# Patient Record
Sex: MALE | Race: OTHER | NOT HISPANIC OR LATINO | URBAN - METROPOLITAN AREA
[De-identification: names, ages, dates, MRNs, and addresses within clinical notes are randomized per-mention and may not be internally consistent; named-entity substitution may affect disease eponyms.]

---

## 2025-02-21 ENCOUNTER — INPATIENT (INPATIENT)
Facility: HOSPITAL | Age: 25
LOS: 0 days | Discharge: HOME | DRG: 176 | End: 2025-02-22
Attending: HOSPITALIST | Admitting: INTERNAL MEDICINE
Payer: COMMERCIAL

## 2025-02-21 VITALS
WEIGHT: 190.04 LBS | HEIGHT: 65 IN | SYSTOLIC BLOOD PRESSURE: 157 MMHG | TEMPERATURE: 98 F | OXYGEN SATURATION: 99 % | DIASTOLIC BLOOD PRESSURE: 86 MMHG | HEART RATE: 74 BPM | RESPIRATION RATE: 18 BRPM

## 2025-02-21 DIAGNOSIS — I26.99 OTHER PULMONARY EMBOLISM WITHOUT ACUTE COR PULMONALE: ICD-10-CM

## 2025-02-21 LAB
ALBUMIN SERPL ELPH-MCNC: 4.6 G/DL — SIGNIFICANT CHANGE UP (ref 3.5–5.2)
ALP SERPL-CCNC: 108 U/L — SIGNIFICANT CHANGE UP (ref 30–115)
ALT FLD-CCNC: 17 U/L — SIGNIFICANT CHANGE UP (ref 0–41)
ANION GAP SERPL CALC-SCNC: 12 MMOL/L — SIGNIFICANT CHANGE UP (ref 7–14)
APTT BLD: 32.8 SEC — SIGNIFICANT CHANGE UP (ref 27–39.2)
AST SERPL-CCNC: 27 U/L — SIGNIFICANT CHANGE UP (ref 0–41)
BASOPHILS # BLD AUTO: 0.05 K/UL — SIGNIFICANT CHANGE UP (ref 0–0.2)
BASOPHILS NFR BLD AUTO: 0.5 % — SIGNIFICANT CHANGE UP (ref 0–1)
BILIRUB SERPL-MCNC: 0.3 MG/DL — SIGNIFICANT CHANGE UP (ref 0.2–1.2)
BUN SERPL-MCNC: 20 MG/DL — SIGNIFICANT CHANGE UP (ref 10–20)
CALCIUM SERPL-MCNC: 9.1 MG/DL — SIGNIFICANT CHANGE UP (ref 8.4–10.5)
CHLORIDE SERPL-SCNC: 102 MMOL/L — SIGNIFICANT CHANGE UP (ref 98–110)
CO2 SERPL-SCNC: 22 MMOL/L — SIGNIFICANT CHANGE UP (ref 17–32)
CREAT SERPL-MCNC: 1.1 MG/DL — SIGNIFICANT CHANGE UP (ref 0.7–1.5)
EGFR: 95 ML/MIN/1.73M2 — SIGNIFICANT CHANGE UP
EGFR: 95 ML/MIN/1.73M2 — SIGNIFICANT CHANGE UP
EOSINOPHIL # BLD AUTO: 0.58 K/UL — SIGNIFICANT CHANGE UP (ref 0–0.7)
EOSINOPHIL NFR BLD AUTO: 5.9 % — SIGNIFICANT CHANGE UP (ref 0–8)
GLUCOSE SERPL-MCNC: 95 MG/DL — SIGNIFICANT CHANGE UP (ref 70–99)
HCT VFR BLD CALC: 42.7 % — SIGNIFICANT CHANGE UP (ref 42–52)
HGB BLD-MCNC: 14.7 G/DL — SIGNIFICANT CHANGE UP (ref 14–18)
IMM GRANULOCYTES NFR BLD AUTO: 0.2 % — SIGNIFICANT CHANGE UP (ref 0.1–0.3)
INR BLD: 1.02 RATIO — SIGNIFICANT CHANGE UP (ref 0.65–1.3)
LYMPHOCYTES # BLD AUTO: 2.78 K/UL — SIGNIFICANT CHANGE UP (ref 1.2–3.4)
LYMPHOCYTES # BLD AUTO: 28.3 % — SIGNIFICANT CHANGE UP (ref 20.5–51.1)
MCHC RBC-ENTMCNC: 30.1 PG — SIGNIFICANT CHANGE UP (ref 27–31)
MCHC RBC-ENTMCNC: 34.4 G/DL — SIGNIFICANT CHANGE UP (ref 32–37)
MCV RBC AUTO: 87.5 FL — SIGNIFICANT CHANGE UP (ref 80–94)
MONOCYTES # BLD AUTO: 0.55 K/UL — SIGNIFICANT CHANGE UP (ref 0.1–0.6)
MONOCYTES NFR BLD AUTO: 5.6 % — SIGNIFICANT CHANGE UP (ref 1.7–9.3)
NEUTROPHILS # BLD AUTO: 5.85 K/UL — SIGNIFICANT CHANGE UP (ref 1.4–6.5)
NEUTROPHILS NFR BLD AUTO: 59.5 % — SIGNIFICANT CHANGE UP (ref 42.2–75.2)
NRBC BLD AUTO-RTO: 0 /100 WBCS — SIGNIFICANT CHANGE UP (ref 0–0)
NT-PROBNP SERPL-SCNC: <36 PG/ML — SIGNIFICANT CHANGE UP (ref 0–300)
PLATELET # BLD AUTO: 196 K/UL — SIGNIFICANT CHANGE UP (ref 130–400)
PMV BLD: 10.5 FL — HIGH (ref 7.4–10.4)
POTASSIUM SERPL-MCNC: 4.5 MMOL/L — SIGNIFICANT CHANGE UP (ref 3.5–5)
POTASSIUM SERPL-SCNC: 4.5 MMOL/L — SIGNIFICANT CHANGE UP (ref 3.5–5)
PROT SERPL-MCNC: 7.4 G/DL — SIGNIFICANT CHANGE UP (ref 6–8)
PROTHROM AB SERPL-ACNC: 12.1 SEC — SIGNIFICANT CHANGE UP (ref 9.95–12.87)
RBC # BLD: 4.88 M/UL — SIGNIFICANT CHANGE UP (ref 4.7–6.1)
RBC # FLD: 13.2 % — SIGNIFICANT CHANGE UP (ref 11.5–14.5)
SODIUM SERPL-SCNC: 136 MMOL/L — SIGNIFICANT CHANGE UP (ref 135–146)
TROPONIN T, HIGH SENSITIVITY RESULT: <6 NG/L — SIGNIFICANT CHANGE UP (ref 6–21)
WBC # BLD: 9.83 K/UL — SIGNIFICANT CHANGE UP (ref 4.8–10.8)
WBC # FLD AUTO: 9.83 K/UL — SIGNIFICANT CHANGE UP (ref 4.8–10.8)

## 2025-02-21 PROCEDURE — 99285 EMERGENCY DEPT VISIT HI MDM: CPT

## 2025-02-21 PROCEDURE — 85025 COMPLETE CBC W/AUTO DIFF WBC: CPT

## 2025-02-21 PROCEDURE — 86147 CARDIOLIPIN ANTIBODY EA IG: CPT

## 2025-02-21 PROCEDURE — 85730 THROMBOPLASTIN TIME PARTIAL: CPT

## 2025-02-21 PROCEDURE — 71275 CT ANGIOGRAPHY CHEST: CPT | Mod: 26

## 2025-02-21 PROCEDURE — 85306 CLOT INHIBIT PROT S FREE: CPT

## 2025-02-21 PROCEDURE — 36415 COLL VENOUS BLD VENIPUNCTURE: CPT

## 2025-02-21 PROCEDURE — 86146 BETA-2 GLYCOPROTEIN ANTIBODY: CPT

## 2025-02-21 PROCEDURE — 73060 X-RAY EXAM OF HUMERUS: CPT | Mod: 26,RT

## 2025-02-21 PROCEDURE — 85613 RUSSELL VIPER VENOM DILUTED: CPT

## 2025-02-21 PROCEDURE — 93970 EXTREMITY STUDY: CPT

## 2025-02-21 PROCEDURE — 85300 ANTITHROMBIN III ACTIVITY: CPT

## 2025-02-21 PROCEDURE — 83735 ASSAY OF MAGNESIUM: CPT

## 2025-02-21 PROCEDURE — 80048 BASIC METABOLIC PNL TOTAL CA: CPT

## 2025-02-21 PROCEDURE — 93971 EXTREMITY STUDY: CPT | Mod: 26,RT

## 2025-02-21 PROCEDURE — 85303 CLOT INHIBIT PROT C ACTIVITY: CPT

## 2025-02-21 RX ORDER — ENOXAPARIN SODIUM 100 MG/ML
80 INJECTION SUBCUTANEOUS ONCE
Refills: 0 | Status: COMPLETED | OUTPATIENT
Start: 2025-02-21 | End: 2025-02-21

## 2025-02-21 RX ORDER — ACETAMINOPHEN 500 MG/5ML
650 LIQUID (ML) ORAL ONCE
Refills: 0 | Status: COMPLETED | OUTPATIENT
Start: 2025-02-21 | End: 2025-02-21

## 2025-02-21 RX ADMIN — ENOXAPARIN SODIUM 80 MILLIGRAM(S): 100 INJECTION SUBCUTANEOUS at 23:07

## 2025-02-21 RX ADMIN — Medication 650 MILLIGRAM(S): at 22:17

## 2025-02-22 VITALS
OXYGEN SATURATION: 99 % | DIASTOLIC BLOOD PRESSURE: 72 MMHG | RESPIRATION RATE: 18 BRPM | HEART RATE: 79 BPM | SYSTOLIC BLOOD PRESSURE: 118 MMHG

## 2025-02-22 LAB
ANION GAP SERPL CALC-SCNC: 10 MMOL/L — SIGNIFICANT CHANGE UP (ref 7–14)
APTT BLD: 38.2 SEC — SIGNIFICANT CHANGE UP (ref 27–39.2)
BASOPHILS # BLD AUTO: 0.05 K/UL — SIGNIFICANT CHANGE UP (ref 0–0.2)
BASOPHILS NFR BLD AUTO: 0.5 % — SIGNIFICANT CHANGE UP (ref 0–1)
BUN SERPL-MCNC: 18 MG/DL — SIGNIFICANT CHANGE UP (ref 10–20)
CALCIUM SERPL-MCNC: 9 MG/DL — SIGNIFICANT CHANGE UP (ref 8.4–10.5)
CHLORIDE SERPL-SCNC: 104 MMOL/L — SIGNIFICANT CHANGE UP (ref 98–110)
CO2 SERPL-SCNC: 26 MMOL/L — SIGNIFICANT CHANGE UP (ref 17–32)
CREAT SERPL-MCNC: 1 MG/DL — SIGNIFICANT CHANGE UP (ref 0.7–1.5)
EGFR: 106 ML/MIN/1.73M2 — SIGNIFICANT CHANGE UP
EGFR: 106 ML/MIN/1.73M2 — SIGNIFICANT CHANGE UP
EOSINOPHIL # BLD AUTO: 0.61 K/UL — SIGNIFICANT CHANGE UP (ref 0–0.7)
EOSINOPHIL NFR BLD AUTO: 6.7 % — SIGNIFICANT CHANGE UP (ref 0–8)
GLUCOSE SERPL-MCNC: 96 MG/DL — SIGNIFICANT CHANGE UP (ref 70–99)
HCT VFR BLD CALC: 42.3 % — SIGNIFICANT CHANGE UP (ref 42–52)
HGB BLD-MCNC: 14.2 G/DL — SIGNIFICANT CHANGE UP (ref 14–18)
IMM GRANULOCYTES NFR BLD AUTO: 0.3 % — SIGNIFICANT CHANGE UP (ref 0.1–0.3)
LYMPHOCYTES # BLD AUTO: 3.24 K/UL — SIGNIFICANT CHANGE UP (ref 1.2–3.4)
LYMPHOCYTES # BLD AUTO: 35.5 % — SIGNIFICANT CHANGE UP (ref 20.5–51.1)
MAGNESIUM SERPL-MCNC: 2 MG/DL — SIGNIFICANT CHANGE UP (ref 1.8–2.4)
MCHC RBC-ENTMCNC: 29.4 PG — SIGNIFICANT CHANGE UP (ref 27–31)
MCHC RBC-ENTMCNC: 33.6 G/DL — SIGNIFICANT CHANGE UP (ref 32–37)
MCV RBC AUTO: 87.6 FL — SIGNIFICANT CHANGE UP (ref 80–94)
MONOCYTES # BLD AUTO: 0.45 K/UL — SIGNIFICANT CHANGE UP (ref 0.1–0.6)
MONOCYTES NFR BLD AUTO: 4.9 % — SIGNIFICANT CHANGE UP (ref 1.7–9.3)
NEUTROPHILS # BLD AUTO: 4.74 K/UL — SIGNIFICANT CHANGE UP (ref 1.4–6.5)
NEUTROPHILS NFR BLD AUTO: 52.1 % — SIGNIFICANT CHANGE UP (ref 42.2–75.2)
NRBC BLD AUTO-RTO: 0 /100 WBCS — SIGNIFICANT CHANGE UP (ref 0–0)
PLATELET # BLD AUTO: 176 K/UL — SIGNIFICANT CHANGE UP (ref 130–400)
PMV BLD: 10.5 FL — HIGH (ref 7.4–10.4)
POTASSIUM SERPL-MCNC: 4.3 MMOL/L — SIGNIFICANT CHANGE UP (ref 3.5–5)
POTASSIUM SERPL-SCNC: 4.3 MMOL/L — SIGNIFICANT CHANGE UP (ref 3.5–5)
RBC # BLD: 4.83 M/UL — SIGNIFICANT CHANGE UP (ref 4.7–6.1)
RBC # FLD: 13.2 % — SIGNIFICANT CHANGE UP (ref 11.5–14.5)
SODIUM SERPL-SCNC: 140 MMOL/L — SIGNIFICANT CHANGE UP (ref 135–146)
WBC # BLD: 9.12 K/UL — SIGNIFICANT CHANGE UP (ref 4.8–10.8)
WBC # FLD AUTO: 9.12 K/UL — SIGNIFICANT CHANGE UP (ref 4.8–10.8)

## 2025-02-22 PROCEDURE — 99235 HOSP IP/OBS SAME DATE MOD 70: CPT

## 2025-02-22 PROCEDURE — 93970 EXTREMITY STUDY: CPT | Mod: 26

## 2025-02-22 RX ORDER — APIXABAN 2.5 MG/1
2 TABLET, FILM COATED ORAL
Qty: 74 | Refills: 0
Start: 2025-02-22 | End: 2025-03-23

## 2025-02-22 RX ORDER — APIXABAN 2.5 MG/1
10 TABLET, FILM COATED ORAL EVERY 12 HOURS
Refills: 0 | Status: DISCONTINUED | OUTPATIENT
Start: 2025-02-22 | End: 2025-02-22

## 2025-02-22 RX ORDER — APIXABAN 2.5 MG/1
1 TABLET, FILM COATED ORAL
Qty: 73 | Refills: 0
Start: 2025-02-22 | End: 2025-03-23

## 2025-02-22 RX ORDER — ACETAMINOPHEN 500 MG/5ML
650 LIQUID (ML) ORAL EVERY 6 HOURS
Refills: 0 | Status: DISCONTINUED | OUTPATIENT
Start: 2025-02-22 | End: 2025-02-22

## 2025-02-22 RX ADMIN — Medication 1 APPLICATION(S): at 05:47

## 2025-02-22 RX ADMIN — APIXABAN 10 MILLIGRAM(S): 2.5 TABLET, FILM COATED ORAL at 08:18

## 2025-02-24 LAB
AT III ACT/NOR PPP CHRO: 92 % — SIGNIFICANT CHANGE UP (ref 85–135)
DRVVT RATIO: 1.02 RATIO — SIGNIFICANT CHANGE UP (ref 0–1.21)
DRVVT SCREEN TO CONFIRM RATIO: SIGNIFICANT CHANGE UP
NORMALIZED SCT PPP-RTO: 1 RATIO — SIGNIFICANT CHANGE UP (ref 0–1.16)
NORMALIZED SCT PPP-RTO: SIGNIFICANT CHANGE UP
PROT C ACT/NOR PPP: 91 % — SIGNIFICANT CHANGE UP (ref 65–129)
PROT S FREE AG PPP IA-ACNC: 107 % — SIGNIFICANT CHANGE UP (ref 67–141)

## 2025-02-26 DIAGNOSIS — I26.93 SINGLE SUBSEGMENTAL THROMBOTIC PULMONARY EMBOLISM WITHOUT ACUTE COR PULMONALE: ICD-10-CM

## 2025-02-26 DIAGNOSIS — I26.99 OTHER PULMONARY EMBOLISM WITHOUT ACUTE COR PULMONALE: ICD-10-CM

## 2025-02-26 LAB
B2 GLYCOPROT1 IGA SER QL: <2 U/ML — SIGNIFICANT CHANGE UP
B2 GLYCOPROT1 IGG SER-ACNC: <1.4 U/ML — SIGNIFICANT CHANGE UP
B2 GLYCOPROT1 IGM SER-ACNC: <1.5 U/ML — SIGNIFICANT CHANGE UP
CARDIOLIPIN IGM SER-MCNC: <1.5 MPL U/ML — SIGNIFICANT CHANGE UP
CARDIOLIPIN IGM SER-MCNC: <1.6 GPL U/ML — SIGNIFICANT CHANGE UP
DEPRECATED CARDIOLIPIN IGA SER: <2 APL U/ML — SIGNIFICANT CHANGE UP

## 2025-03-20 ENCOUNTER — APPOINTMENT (OUTPATIENT)
Age: 25
End: 2025-03-20

## 2025-03-20 ENCOUNTER — OUTPATIENT (OUTPATIENT)
Dept: OUTPATIENT SERVICES | Facility: HOSPITAL | Age: 25
LOS: 1 days | End: 2025-03-20
Payer: COMMERCIAL

## 2025-03-20 ENCOUNTER — APPOINTMENT (OUTPATIENT)
Age: 25
End: 2025-03-20
Payer: COMMERCIAL

## 2025-03-20 ENCOUNTER — TRANSCRIPTION ENCOUNTER (OUTPATIENT)
Age: 25
End: 2025-03-20

## 2025-03-20 VITALS
DIASTOLIC BLOOD PRESSURE: 83 MMHG | OXYGEN SATURATION: 100 % | RESPIRATION RATE: 16 BRPM | HEART RATE: 69 BPM | HEIGHT: 65 IN | TEMPERATURE: 98.1 F | WEIGHT: 187 LBS | BODY MASS INDEX: 31.16 KG/M2 | SYSTOLIC BLOOD PRESSURE: 127 MMHG

## 2025-03-20 DIAGNOSIS — Z78.9 OTHER SPECIFIED HEALTH STATUS: ICD-10-CM

## 2025-03-20 DIAGNOSIS — D68.9 COAGULATION DEFECT, UNSPECIFIED: ICD-10-CM

## 2025-03-20 PROBLEM — Z00.00 ENCOUNTER FOR PREVENTIVE HEALTH EXAMINATION: Status: ACTIVE | Noted: 2025-03-20

## 2025-03-20 PROCEDURE — 81241 F5 GENE: CPT

## 2025-03-20 PROCEDURE — 99204 OFFICE O/P NEW MOD 45 MIN: CPT

## 2025-03-20 PROCEDURE — 81240 F2 GENE: CPT

## 2025-03-20 RX ORDER — APIXABAN 5 MG/1
TABLET, FILM COATED ORAL
Refills: 0 | Status: ACTIVE | COMMUNITY

## 2025-03-20 RX ORDER — APIXABAN 5 MG/1
5 TABLET, FILM COATED ORAL
Qty: 180 | Refills: 2 | Status: ACTIVE | COMMUNITY
Start: 2025-03-20 | End: 1900-01-01

## 2025-03-21 DIAGNOSIS — D68.9 COAGULATION DEFECT, UNSPECIFIED: ICD-10-CM

## 2025-03-26 ENCOUNTER — NON-APPOINTMENT (OUTPATIENT)
Age: 25
End: 2025-03-26

## 2025-03-27 ENCOUNTER — APPOINTMENT (OUTPATIENT)
Dept: VASCULAR SURGERY | Facility: CLINIC | Age: 25
End: 2025-03-27
Payer: COMMERCIAL

## 2025-03-27 VITALS — SYSTOLIC BLOOD PRESSURE: 121 MMHG | DIASTOLIC BLOOD PRESSURE: 73 MMHG

## 2025-03-27 VITALS — HEIGHT: 65 IN | WEIGHT: 187 LBS | BODY MASS INDEX: 31.16 KG/M2

## 2025-03-27 DIAGNOSIS — M79.89 OTHER SPECIFIED SOFT TISSUE DISORDERS: ICD-10-CM

## 2025-03-27 DIAGNOSIS — I82.629 ACUTE EMBOLISM AND THROMBOSIS OF DEEP VEINS OF UNSPECIFIED UPPER EXTREMITY: ICD-10-CM

## 2025-03-27 PROBLEM — I26.99 PULMONARY EMBOLISM, UNSPECIFIED CHRONICITY, UNSPECIFIED PULMONARY EMBOLISM TYPE, UNSPECIFIED WHETHER ACUTE COR PULMONALE PRESENT: Status: ACTIVE | Noted: 2025-03-27

## 2025-03-27 LAB
DNA PLOIDY SPEC FC-IMP: NORMAL
PTR INTERP: NORMAL

## 2025-03-27 PROCEDURE — 99204 OFFICE O/P NEW MOD 45 MIN: CPT

## 2025-03-27 PROCEDURE — 93971 EXTREMITY STUDY: CPT

## 2025-03-28 ENCOUNTER — OUTPATIENT (OUTPATIENT)
Dept: OUTPATIENT SERVICES | Facility: HOSPITAL | Age: 25
LOS: 1 days | End: 2025-03-28
Payer: COMMERCIAL

## 2025-03-28 VITALS
SYSTOLIC BLOOD PRESSURE: 110 MMHG | DIASTOLIC BLOOD PRESSURE: 70 MMHG | TEMPERATURE: 98 F | RESPIRATION RATE: 16 BRPM | WEIGHT: 186.95 LBS | HEART RATE: 76 BPM | OXYGEN SATURATION: 99 % | HEIGHT: 66 IN

## 2025-03-28 DIAGNOSIS — Z01.818 ENCOUNTER FOR OTHER PREPROCEDURAL EXAMINATION: ICD-10-CM

## 2025-03-28 DIAGNOSIS — Z92.89 PERSONAL HISTORY OF OTHER MEDICAL TREATMENT: Chronic | ICD-10-CM

## 2025-03-28 PROCEDURE — 99214 OFFICE O/P EST MOD 30 MIN: CPT | Mod: 25

## 2025-03-28 NOTE — H&P PST ADULT - HISTORY OF PRESENT ILLNESS
23 Y/O MALE PT TO PAST WITH HX              PT NOW FOR SCHEDULED PROCEDURE. PT DENIES ANY CP SOB PALP COUGH DYSURIA FEVER URI. PT ABLE TO GRACIELA 1-2 FOS W/O SOB  Anesthesia Alert  NO--Difficult Airway  NO--History of neck surgery or radiation  NO--Limited ROM of neck  NO--History of Malignant hyperthermia  NO--Personal or family history of Pseudocholinesterase deficiency.  NO--Prior Anesthesia Complication  NO--Latex Allergy  NO--Loose teeth  NO--History of Rheumatoid Arthritis  NO--AICHA  NO--Bleeding risk  NO--Other_____   25 Y/O MALE PT TO PAST WITH HX  DVT/ PE 2/21/25  PT C/O SWELLING RUE AND SOB X 1 DAY  PT WORKED UP BY HEME ALL TESTS NEG SO FAR  PT NOW FOR SCHEDULED PROCEDURE ( RUE VENOGRAM, POSS ENDOVASCULAR REVASCULARIZATION . PT DENIES ANY CP SOB PALP COUGH DYSURIA FEVER URI.   Anesthesia Alert  NO--Difficult Airway  NO--History of neck surgery or radiation  NO--Limited ROM of neck  NO--History of Malignant hyperthermia  NO--Personal or family history of Pseudocholinesterase deficiency.  NO--Prior Anesthesia Complication  NO--Latex Allergy  NO--Loose teeth  NO--History of Rheumatoid Arthritis  NO--AICHA  NO--Bleeding risk  NO--Other_____  RCRI CLASS I  DASI 9 METS

## 2025-03-29 DIAGNOSIS — I82.629 ACUTE EMBOLISM AND THROMBOSIS OF DEEP VEINS OF UNSPECIFIED UPPER EXTREMITY: ICD-10-CM

## 2025-03-29 DIAGNOSIS — Z01.818 ENCOUNTER FOR OTHER PREPROCEDURAL EXAMINATION: ICD-10-CM

## 2025-03-31 ENCOUNTER — APPOINTMENT (OUTPATIENT)
Facility: CLINIC | Age: 25
End: 2025-03-31
Payer: COMMERCIAL

## 2025-03-31 VITALS
DIASTOLIC BLOOD PRESSURE: 70 MMHG | HEART RATE: 187 BPM | BODY MASS INDEX: 31.12 KG/M2 | RESPIRATION RATE: 14 BRPM | WEIGHT: 187 LBS | OXYGEN SATURATION: 99 % | SYSTOLIC BLOOD PRESSURE: 110 MMHG

## 2025-03-31 DIAGNOSIS — I26.99 OTHER PULMONARY EMBOLISM W/OUT ACUTE COR PULMONALE: ICD-10-CM

## 2025-03-31 PROBLEM — I82.409 ACUTE EMBOLISM AND THROMBOSIS OF UNSPECIFIED DEEP VEINS OF UNSPECIFIED LOWER EXTREMITY: Chronic | Status: ACTIVE | Noted: 2025-03-28

## 2025-03-31 PROCEDURE — 99204 OFFICE O/P NEW MOD 45 MIN: CPT

## 2025-04-02 ENCOUNTER — OUTPATIENT (OUTPATIENT)
Dept: OUTPATIENT SERVICES | Facility: HOSPITAL | Age: 25
LOS: 1 days | End: 2025-04-02
Payer: COMMERCIAL

## 2025-04-02 ENCOUNTER — APPOINTMENT (OUTPATIENT)
Dept: PULMONOLOGY | Facility: HOSPITAL | Age: 25
End: 2025-04-02

## 2025-04-02 DIAGNOSIS — R06.02 SHORTNESS OF BREATH: ICD-10-CM

## 2025-04-02 DIAGNOSIS — Z92.89 PERSONAL HISTORY OF OTHER MEDICAL TREATMENT: Chronic | ICD-10-CM

## 2025-04-02 PROCEDURE — 94664 DEMO&/EVAL PT USE INHALER: CPT

## 2025-04-02 PROCEDURE — 94729 DIFFUSING CAPACITY: CPT

## 2025-04-02 PROCEDURE — 94727 GAS DIL/WSHOT DETER LNG VOL: CPT

## 2025-04-02 PROCEDURE — 94070 EVALUATION OF WHEEZING: CPT

## 2025-04-03 DIAGNOSIS — R06.02 SHORTNESS OF BREATH: ICD-10-CM

## 2025-04-08 NOTE — ASU PATIENT PROFILE, ADULT - FALL HARM RISK - UNIVERSAL INTERVENTIONS
Bed in lowest position, wheels locked, appropriate side rails in place/Call bell, personal items and telephone in reach/Instruct patient to call for assistance before getting out of bed or chair/Non-slip footwear when patient is out of bed/[place] to call system/Physically safe environment - no spills, clutter or unnecessary equipment/Purposeful Proactive Rounding/Room/bathroom lighting operational, light cord in reach

## 2025-04-09 ENCOUNTER — OUTPATIENT (OUTPATIENT)
Dept: OUTPATIENT SERVICES | Facility: HOSPITAL | Age: 25
LOS: 1 days | Discharge: HOME | End: 2025-04-09
Payer: COMMERCIAL

## 2025-04-09 ENCOUNTER — APPOINTMENT (OUTPATIENT)
Dept: VASCULAR SURGERY | Facility: HOSPITAL | Age: 25
End: 2025-04-09

## 2025-04-09 ENCOUNTER — TRANSCRIPTION ENCOUNTER (OUTPATIENT)
Age: 25
End: 2025-04-09

## 2025-04-09 VITALS
TEMPERATURE: 98 F | WEIGHT: 186.95 LBS | DIASTOLIC BLOOD PRESSURE: 71 MMHG | SYSTOLIC BLOOD PRESSURE: 130 MMHG | OXYGEN SATURATION: 100 % | HEIGHT: 65 IN | HEART RATE: 87 BPM

## 2025-04-09 VITALS
RESPIRATION RATE: 12 BRPM | OXYGEN SATURATION: 99 % | SYSTOLIC BLOOD PRESSURE: 110 MMHG | HEART RATE: 70 BPM | TEMPERATURE: 98 F | DIASTOLIC BLOOD PRESSURE: 70 MMHG

## 2025-04-09 DIAGNOSIS — Z92.89 PERSONAL HISTORY OF OTHER MEDICAL TREATMENT: Chronic | ICD-10-CM

## 2025-04-09 PROCEDURE — 76937 US GUIDE VASCULAR ACCESS: CPT | Mod: 26

## 2025-04-09 PROCEDURE — C1757: CPT

## 2025-04-09 PROCEDURE — 37187 VENOUS MECH THROMBECTOMY: CPT | Mod: RT

## 2025-04-09 PROCEDURE — C1894: CPT

## 2025-04-09 PROCEDURE — C1725: CPT

## 2025-04-09 PROCEDURE — 36010 PLACE CATHETER IN VEIN: CPT | Mod: RT

## 2025-04-09 PROCEDURE — C1769: CPT

## 2025-04-09 PROCEDURE — 73090 X-RAY EXAM OF FOREARM: CPT | Mod: RT

## 2025-04-09 PROCEDURE — 37248 TRLUML BALO ANGIOP 1ST VEIN: CPT | Mod: RT

## 2025-04-09 PROCEDURE — C1887: CPT

## 2025-04-09 RX ORDER — SODIUM CHLORIDE 9 G/1000ML
1000 INJECTION, SOLUTION INTRAVENOUS
Refills: 0 | Status: DISCONTINUED | OUTPATIENT
Start: 2025-04-09 | End: 2025-04-09

## 2025-04-09 RX ORDER — APIXABAN 2.5 MG/1
1 TABLET, FILM COATED ORAL
Refills: 0 | DISCHARGE

## 2025-04-09 RX ORDER — OXYCODONE HYDROCHLORIDE AND ACETAMINOPHEN 10; 325 MG/1; MG/1
1 TABLET ORAL ONCE
Refills: 0 | Status: DISCONTINUED | OUTPATIENT
Start: 2025-04-09 | End: 2025-04-09

## 2025-04-09 RX ORDER — HYDROMORPHONE/SOD CHLOR,ISO/PF 2 MG/10 ML
0.5 SYRINGE (ML) INJECTION
Refills: 0 | Status: DISCONTINUED | OUTPATIENT
Start: 2025-04-09 | End: 2025-04-09

## 2025-04-09 RX ORDER — HYDROMORPHONE/SOD CHLOR,ISO/PF 2 MG/10 ML
1 SYRINGE (ML) INJECTION
Refills: 0 | Status: DISCONTINUED | OUTPATIENT
Start: 2025-04-09 | End: 2025-04-09

## 2025-04-09 RX ORDER — ONDANSETRON HCL/PF 4 MG/2 ML
4 VIAL (ML) INJECTION ONCE
Refills: 0 | Status: DISCONTINUED | OUTPATIENT
Start: 2025-04-09 | End: 2025-04-09

## 2025-04-09 RX ORDER — OXYCODONE HYDROCHLORIDE AND ACETAMINOPHEN 10; 325 MG/1; MG/1
2 TABLET ORAL ONCE
Refills: 0 | Status: DISCONTINUED | OUTPATIENT
Start: 2025-04-09 | End: 2025-04-09

## 2025-04-09 NOTE — ASU DISCHARGE PLAN (ADULT/PEDIATRIC) - CARE PROVIDER_API CALL
Manuel Siu.  Vascular Surgery  [address] [place] [address], [address]  [place] [address]  Phone: [phone]  Fax: [phone]  Established Patient  Follow Up Time: 2 weeks

## 2025-04-09 NOTE — BRIEF OPERATIVE NOTE - OPERATION/FINDINGS
Procedure:  Ultrasound guided access of right basilic vein  Thoracic and right upper extremity venogram  In-Thrill mechanical thrombectomy of right subclavian vein DVT  POBA right subclavian vein    Findings:  Chronic right subclavian DVT with extrinsic compression of the subclavian vein at the level of the thoracic outlet

## 2025-04-09 NOTE — BRIEF OPERATIVE NOTE - NSICDXBRIEFPREOP_GEN_ALL_CORE_FT
PRE-OP DIAGNOSIS:  Venous thoracic outlet syndrome of subclavian vein 09-Apr-2025 08:57:07  Libby Green

## 2025-04-09 NOTE — ASU DISCHARGE PLAN (ADULT/PEDIATRIC) - ASU DC SPECIAL INSTRUCTIONSFT
Please remove the dressings to your right arm after 48 hours. OK to shower 48 hours after your procedure. Please no soaking in tubs or swimming until your incision has fully healed. Please replace the wrap or apply a compression sleeve to your right arm after you remove the bandage.

## 2025-04-09 NOTE — CHART NOTE - NSCHARTNOTEFT_GEN_A_CORE
PACU ANESTHESIA ADMISSION NOTE      Procedure: Venous thrombectomy with imaging guidance  Post op diagnosis:        __x__  Patent Airway    ____  Full return of protective reflexes    ____  Full recovery from anesthesia / back to baseline     Vitals:   T: 98.1 F          R:  12                BP:  134/78                Sat:  100%                 P: 79      Mental Status:  ___x_ Awake   _____ Alert   _____ Drowsy   _____ Sedated    Nausea/Vomiting:  ____ NO  ______Yes,   See Post - Op Orders          Pain Scale (0-10):  _____    Treatment: ____ None    ___x_ See Post - Op/PCA Orders    Post - Operative Fluids:   ____ Oral   __x__ See Post - Op Orders    Plan: Discharge:   __x__Home       _____Floor     _____Critical Care    _____  Other:_________________    Comments: Pt tolerated procedure well, no anesthesia related complications. Care of pt endorsed to PACU, report given to PACU RN. Discharge when criteria are met.

## 2025-04-09 NOTE — ASU DISCHARGE PLAN (ADULT/PEDIATRIC) - FINANCIAL ASSISTANCE
[facility] provides services at a reduced cost to those who are determined to be eligible through [facility]’s financial assistance program. Information regarding [facility]’s financial assistance program can be found by going to [email or web address] or by calling [phone].

## 2025-04-16 DIAGNOSIS — I82.621 ACUTE EMBOLISM AND THROMBOSIS OF DEEP VEINS OF RIGHT UPPER EXTREMITY: ICD-10-CM

## 2025-04-16 DIAGNOSIS — Z79.01 LONG TERM (CURRENT) USE OF ANTICOAGULANTS: ICD-10-CM

## 2025-04-16 DIAGNOSIS — Z86.711 PERSONAL HISTORY OF PULMONARY EMBOLISM: ICD-10-CM

## 2025-04-22 ENCOUNTER — APPOINTMENT (OUTPATIENT)
Dept: VASCULAR SURGERY | Facility: CLINIC | Age: 25
End: 2025-04-22
Payer: COMMERCIAL

## 2025-04-22 VITALS — DIASTOLIC BLOOD PRESSURE: 71 MMHG | SYSTOLIC BLOOD PRESSURE: 117 MMHG

## 2025-04-22 VITALS — HEIGHT: 65 IN | WEIGHT: 186 LBS | BODY MASS INDEX: 30.99 KG/M2

## 2025-04-22 DIAGNOSIS — I82.629 ACUTE EMBOLISM AND THROMBOSIS OF DEEP VEINS OF UNSPECIFIED UPPER EXTREMITY: ICD-10-CM

## 2025-04-22 PROCEDURE — 99214 OFFICE O/P EST MOD 30 MIN: CPT

## 2025-04-22 PROCEDURE — 93971 EXTREMITY STUDY: CPT

## 2025-05-05 ENCOUNTER — APPOINTMENT (OUTPATIENT)
Facility: CLINIC | Age: 25
End: 2025-05-05
Payer: COMMERCIAL

## 2025-05-05 VITALS
DIASTOLIC BLOOD PRESSURE: 80 MMHG | BODY MASS INDEX: 30.99 KG/M2 | SYSTOLIC BLOOD PRESSURE: 121 MMHG | HEIGHT: 65 IN | OXYGEN SATURATION: 99 % | WEIGHT: 186 LBS | HEART RATE: 88 BPM

## 2025-05-05 DIAGNOSIS — I26.99 OTHER PULMONARY EMBOLISM W/OUT ACUTE COR PULMONALE: ICD-10-CM

## 2025-05-05 PROCEDURE — 99214 OFFICE O/P EST MOD 30 MIN: CPT

## 2025-05-23 ENCOUNTER — OUTPATIENT (OUTPATIENT)
Dept: OUTPATIENT SERVICES | Facility: HOSPITAL | Age: 25
LOS: 1 days | End: 2025-05-23
Payer: COMMERCIAL

## 2025-05-23 VITALS
HEART RATE: 78 BPM | DIASTOLIC BLOOD PRESSURE: 70 MMHG | HEIGHT: 66 IN | WEIGHT: 189.6 LBS | RESPIRATION RATE: 16 BRPM | TEMPERATURE: 97 F | OXYGEN SATURATION: 98 % | SYSTOLIC BLOOD PRESSURE: 110 MMHG

## 2025-05-23 DIAGNOSIS — Z01.818 ENCOUNTER FOR OTHER PREPROCEDURAL EXAMINATION: ICD-10-CM

## 2025-05-23 DIAGNOSIS — Z98.890 OTHER SPECIFIED POSTPROCEDURAL STATES: Chronic | ICD-10-CM

## 2025-05-23 DIAGNOSIS — Z92.89 PERSONAL HISTORY OF OTHER MEDICAL TREATMENT: Chronic | ICD-10-CM

## 2025-05-23 LAB
ALBUMIN SERPL ELPH-MCNC: 4.9 G/DL — SIGNIFICANT CHANGE UP (ref 3.5–5.2)
ALP SERPL-CCNC: 81 U/L — SIGNIFICANT CHANGE UP (ref 30–115)
ALT FLD-CCNC: 12 U/L — SIGNIFICANT CHANGE UP (ref 0–41)
ANION GAP SERPL CALC-SCNC: 12 MMOL/L — SIGNIFICANT CHANGE UP (ref 7–14)
AST SERPL-CCNC: 18 U/L — SIGNIFICANT CHANGE UP (ref 0–41)
BASOPHILS # BLD AUTO: 0.03 K/UL — SIGNIFICANT CHANGE UP (ref 0–0.2)
BASOPHILS NFR BLD AUTO: 0.5 % — SIGNIFICANT CHANGE UP (ref 0–1)
BILIRUB SERPL-MCNC: 0.6 MG/DL — SIGNIFICANT CHANGE UP (ref 0.2–1.2)
BLD GP AB SCN SERPL QL: SIGNIFICANT CHANGE UP
BUN SERPL-MCNC: 17 MG/DL — SIGNIFICANT CHANGE UP (ref 10–20)
CALCIUM SERPL-MCNC: 9.8 MG/DL — SIGNIFICANT CHANGE UP (ref 8.4–10.5)
CHLORIDE SERPL-SCNC: 104 MMOL/L — SIGNIFICANT CHANGE UP (ref 98–110)
CO2 SERPL-SCNC: 25 MMOL/L — SIGNIFICANT CHANGE UP (ref 17–32)
CREAT SERPL-MCNC: 1 MG/DL — SIGNIFICANT CHANGE UP (ref 0.7–1.5)
EGFR: 106 ML/MIN/1.73M2 — SIGNIFICANT CHANGE UP
EGFR: 106 ML/MIN/1.73M2 — SIGNIFICANT CHANGE UP
EOSINOPHIL # BLD AUTO: 0.25 K/UL — SIGNIFICANT CHANGE UP (ref 0–0.7)
EOSINOPHIL NFR BLD AUTO: 4.1 % — SIGNIFICANT CHANGE UP (ref 0–8)
GLUCOSE SERPL-MCNC: 83 MG/DL — SIGNIFICANT CHANGE UP (ref 70–99)
HCT VFR BLD CALC: 44.6 % — SIGNIFICANT CHANGE UP (ref 42–52)
HGB BLD-MCNC: 15.4 G/DL — SIGNIFICANT CHANGE UP (ref 14–18)
IMM GRANULOCYTES NFR BLD AUTO: 0.2 % — SIGNIFICANT CHANGE UP (ref 0.1–0.3)
LYMPHOCYTES # BLD AUTO: 2.06 K/UL — SIGNIFICANT CHANGE UP (ref 1.2–3.4)
LYMPHOCYTES # BLD AUTO: 33.8 % — SIGNIFICANT CHANGE UP (ref 20.5–51.1)
MCHC RBC-ENTMCNC: 28.7 PG — SIGNIFICANT CHANGE UP (ref 27–31)
MCHC RBC-ENTMCNC: 34.5 G/DL — SIGNIFICANT CHANGE UP (ref 32–37)
MCV RBC AUTO: 83.2 FL — SIGNIFICANT CHANGE UP (ref 80–94)
MONOCYTES # BLD AUTO: 0.29 K/UL — SIGNIFICANT CHANGE UP (ref 0.1–0.6)
MONOCYTES NFR BLD AUTO: 4.8 % — SIGNIFICANT CHANGE UP (ref 1.7–9.3)
NEUTROPHILS # BLD AUTO: 3.46 K/UL — SIGNIFICANT CHANGE UP (ref 1.4–6.5)
NEUTROPHILS NFR BLD AUTO: 56.6 % — SIGNIFICANT CHANGE UP (ref 42.2–75.2)
NRBC BLD AUTO-RTO: 0 /100 WBCS — SIGNIFICANT CHANGE UP (ref 0–0)
PLATELET # BLD AUTO: 209 K/UL — SIGNIFICANT CHANGE UP (ref 130–400)
PMV BLD: 9.9 FL — SIGNIFICANT CHANGE UP (ref 7.4–10.4)
POTASSIUM SERPL-MCNC: 4.3 MMOL/L — SIGNIFICANT CHANGE UP (ref 3.5–5)
POTASSIUM SERPL-SCNC: 4.3 MMOL/L — SIGNIFICANT CHANGE UP (ref 3.5–5)
PROT SERPL-MCNC: 7.8 G/DL — SIGNIFICANT CHANGE UP (ref 6–8)
RBC # BLD: 5.36 M/UL — SIGNIFICANT CHANGE UP (ref 4.7–6.1)
RBC # FLD: 12.8 % — SIGNIFICANT CHANGE UP (ref 11.5–14.5)
SODIUM SERPL-SCNC: 141 MMOL/L — SIGNIFICANT CHANGE UP (ref 135–146)
WBC # BLD: 6.1 K/UL — SIGNIFICANT CHANGE UP (ref 4.8–10.8)
WBC # FLD AUTO: 6.1 K/UL — SIGNIFICANT CHANGE UP (ref 4.8–10.8)

## 2025-05-23 PROCEDURE — 85025 COMPLETE CBC W/AUTO DIFF WBC: CPT

## 2025-05-23 PROCEDURE — 80053 COMPREHEN METABOLIC PANEL: CPT

## 2025-05-23 PROCEDURE — 93005 ELECTROCARDIOGRAM TRACING: CPT

## 2025-05-23 PROCEDURE — 86901 BLOOD TYPING SEROLOGIC RH(D): CPT

## 2025-05-23 PROCEDURE — 36415 COLL VENOUS BLD VENIPUNCTURE: CPT

## 2025-05-23 PROCEDURE — 93010 ELECTROCARDIOGRAM REPORT: CPT

## 2025-05-23 PROCEDURE — 86850 RBC ANTIBODY SCREEN: CPT

## 2025-05-23 PROCEDURE — 86900 BLOOD TYPING SEROLOGIC ABO: CPT

## 2025-05-23 PROCEDURE — 99214 OFFICE O/P EST MOD 30 MIN: CPT | Mod: 25

## 2025-05-23 NOTE — H&P PST ADULT - HISTORY OF PRESENT ILLNESS
24 Y/O MALE PT TO PAST WITH HX              PT NOW FOR SCHEDULED PROCEDURE. PT DENIES ANY CP SOB PALP COUGH DYSURIA FEVER URI   Anesthesia Alert  NO--Difficult Airway  NO--History of neck surgery or radiation  NO--Limited ROM of neck  NO--History of Malignant hyperthermia  NO--Personal or family history of Pseudocholinesterase deficiency.  NO--Prior Anesthesia Complication  NO--Latex Allergy  NO--Loose teeth  NO--History of Rheumatoid Arthritis  NO--AICHA  NO--Bleeding risk  NO--Other_____   26 Y/O MALE PT TO PAST WITH HX RIGHT UE DVT , 2/25  S/P RIGHT UE EDEMA AND SOB    PT NOW FOR SCHEDULED PROCEDURE ( RIGHT FIRST RIB RESECTION) . PT DENIES ANY CP SOB PALP COUGH DYSURIA FEVER URI   Anesthesia Alert  NO--Difficult Airway  NO--History of neck surgery or radiation  NO--Limited ROM of neck  NO--History of Malignant hyperthermia  NO--Personal or family history of Pseudocholinesterase deficiency.  NO--Prior Anesthesia Complication  NO--Latex Allergy  NO--Loose teeth  NO--History of Rheumatoid Arthritis  NO--AICHA  NO--Bleeding risk  NO--Other_____   26 Y/O MALE PT TO PAST WITH HX RIGHT UE DVT, PE  , 2/25  S/P RIGHT UE EDEMA AND SOB 2/ 25   - S/P SUBSEQUENT  VENOGRAM/ VENOPLASTY  PT NOW FOR SCHEDULED PROCEDURE ( RIGHT FIRST RIB RESECTION) . PT DENIES ANY CP SOB PALP COUGH DYSURIA FEVER URI    Anesthesia Alert  NO--Difficult Airway  NO--History of neck surgery or radiation  NO--Limited ROM of neck  NO--History of Malignant hyperthermia  NO--Personal or family history of Pseudocholinesterase deficiency.  NO--Prior Anesthesia Complication  NO--Latex Allergy  NO--Loose teeth  NO--History of Rheumatoid Arthritis  NO--AICHA  NO--Bleeding risk  NO--Other_____  RCRI CLASS I  DASI 9 METS

## 2025-05-23 NOTE — H&P PST ADULT - NSICDXFAMILYHX_GEN_ALL_CORE_FT
FAMILY HISTORY:  Mother  Still living? Unknown  FH: breast cancer, Age at diagnosis: Age Unknown     FAMILY HISTORY:  Mother  Still living? Unknown  FH: breast cancer, Age at diagnosis: Age Unknown    Grandparent  Still living? Unknown  Family history of diabetes mellitus (DM), Age at diagnosis: Age Unknown

## 2025-05-23 NOTE — H&P PST ADULT - NSICDXPASTSURGICALHX_GEN_ALL_CORE_FT
PAST SURGICAL HISTORY:  History of dental surgery      PAST SURGICAL HISTORY:  History of dental surgery     S/P vascular surgery

## 2025-05-24 DIAGNOSIS — I82.629 ACUTE EMBOLISM AND THROMBOSIS OF DEEP VEINS OF UNSPECIFIED UPPER EXTREMITY: ICD-10-CM

## 2025-05-24 DIAGNOSIS — Z01.818 ENCOUNTER FOR OTHER PREPROCEDURAL EXAMINATION: ICD-10-CM

## 2025-06-11 ENCOUNTER — APPOINTMENT (OUTPATIENT)
Dept: VASCULAR SURGERY | Facility: HOSPITAL | Age: 25
End: 2025-06-11

## 2025-06-11 ENCOUNTER — INPATIENT (INPATIENT)
Facility: HOSPITAL | Age: 25
LOS: 0 days | Discharge: HOME | DRG: 254 | End: 2025-06-12
Attending: SURGERY | Admitting: SURGERY
Payer: COMMERCIAL

## 2025-06-11 ENCOUNTER — RESULT REVIEW (OUTPATIENT)
Age: 25
End: 2025-06-11

## 2025-06-11 VITALS
HEART RATE: 63 BPM | OXYGEN SATURATION: 99 % | TEMPERATURE: 98 F | DIASTOLIC BLOOD PRESSURE: 69 MMHG | SYSTOLIC BLOOD PRESSURE: 111 MMHG | RESPIRATION RATE: 18 BRPM

## 2025-06-11 DIAGNOSIS — Z92.89 PERSONAL HISTORY OF OTHER MEDICAL TREATMENT: Chronic | ICD-10-CM

## 2025-06-11 DIAGNOSIS — Z98.890 OTHER SPECIFIED POSTPROCEDURAL STATES: Chronic | ICD-10-CM

## 2025-06-11 PROCEDURE — 21615 EXCISION 1ST &/CERVICAL RIB: CPT | Mod: RT

## 2025-06-11 PROCEDURE — 88305 TISSUE EXAM BY PATHOLOGIST: CPT | Mod: 26

## 2025-06-11 PROCEDURE — 71045 X-RAY EXAM CHEST 1 VIEW: CPT | Mod: 26

## 2025-06-11 PROCEDURE — 97161 PT EVAL LOW COMPLEX 20 MIN: CPT | Mod: GP

## 2025-06-11 PROCEDURE — C1889: CPT

## 2025-06-11 PROCEDURE — 88300 SURGICAL PATH GROSS: CPT | Mod: 26,59

## 2025-06-11 PROCEDURE — 71045 X-RAY EXAM CHEST 1 VIEW: CPT

## 2025-06-11 PROCEDURE — C9399: CPT

## 2025-06-11 PROCEDURE — 97166 OT EVAL MOD COMPLEX 45 MIN: CPT | Mod: GO

## 2025-06-11 PROCEDURE — 88305 TISSUE EXAM BY PATHOLOGIST: CPT

## 2025-06-11 PROCEDURE — 88300 SURGICAL PATH GROSS: CPT

## 2025-06-11 RX ORDER — ACETAMINOPHEN 500 MG/5ML
650 LIQUID (ML) ORAL EVERY 6 HOURS
Refills: 0 | Status: DISCONTINUED | OUTPATIENT
Start: 2025-06-11 | End: 2025-06-12

## 2025-06-11 RX ORDER — HYDROMORPHONE/SOD CHLOR,ISO/PF 2 MG/10 ML
0.5 SYRINGE (ML) INJECTION
Refills: 0 | Status: DISCONTINUED | OUTPATIENT
Start: 2025-06-11 | End: 2025-06-11

## 2025-06-11 RX ORDER — ONDANSETRON HCL/PF 4 MG/2 ML
4 VIAL (ML) INJECTION ONCE
Refills: 0 | Status: DISCONTINUED | OUTPATIENT
Start: 2025-06-11 | End: 2025-06-11

## 2025-06-11 RX ORDER — HYDROMORPHONE/SOD CHLOR,ISO/PF 2 MG/10 ML
1 SYRINGE (ML) INJECTION
Refills: 0 | Status: DISCONTINUED | OUTPATIENT
Start: 2025-06-11 | End: 2025-06-11

## 2025-06-11 RX ORDER — OXYCODONE HYDROCHLORIDE 30 MG/1
5 TABLET ORAL EVERY 4 HOURS
Refills: 0 | Status: DISCONTINUED | OUTPATIENT
Start: 2025-06-11 | End: 2025-06-12

## 2025-06-11 RX ORDER — SODIUM CHLORIDE 9 G/1000ML
1000 INJECTION, SOLUTION INTRAVENOUS
Refills: 0 | Status: DISCONTINUED | OUTPATIENT
Start: 2025-06-11 | End: 2025-06-11

## 2025-06-11 RX ORDER — SODIUM CHLORIDE 9 G/1000ML
1000 INJECTION, SOLUTION INTRAVENOUS
Refills: 0 | Status: DISCONTINUED | OUTPATIENT
Start: 2025-06-11 | End: 2025-06-12

## 2025-06-11 RX ADMIN — Medication 650 MILLIGRAM(S): at 19:55

## 2025-06-11 RX ADMIN — SODIUM CHLORIDE 75 MILLILITER(S): 9 INJECTION, SOLUTION INTRAVENOUS at 10:56

## 2025-06-11 NOTE — PATIENT PROFILE ADULT - FALL HARM RISK - RISK INTERVENTIONS
Assistance OOB with selected safe patient handling equipment/Assistance with ambulation/Communicate Fall Risk and Risk Factors to all staff, patient, and family/Monitor gait and stability/Reinforce activity limits and safety measures with patient and family/Sit up slowly, dangle for a short time, stand at bedside before walking/Use of alarms - bed, chair and/or voice tab/Visual Cue: Yellow wristband/Bed in lowest position, wheels locked, appropriate side rails in place/Call bell, personal items and telephone in reach/Instruct patient to call for assistance before getting out of bed or chair/Non-slip footwear when patient is out of bed/[place] to call system/Physically safe environment - no spills, clutter or unnecessary equipment/Purposeful Proactive Rounding/Room/bathroom lighting operational, light cord in reach

## 2025-06-11 NOTE — CHART NOTE - NSCHARTNOTEFT_GEN_A_CORE
Post Operative Note  Patient: DAVIDE SEXTON 25y (2000) Male   MRN: [id number]  Location: [facility] Pre-op Hold 003 A  Visit: 06-11-25 Inpatient  Date: 06-11-25 @ 13:45    Procedure: S/P Resection of right 1st rib    Subjective: Pt reports feeling well overall. Complains of some back pain well controlled with medication. He denies nausea, vomiting, chest pain, SOB, light handedness or doziness. R ELZA drain in place with sanguinous output. Dressing is C/D/I.    Nausea:  no, Vomiting:  no, Ambulating:  no, Flatus:  no  Pain Assessment: yes , Location: Back , Pain Intensity: 3/10 , Quality: Sore    Objective:  Vitals: T(F): 97.7 (06-11-25 @ 12:00), Max: 97.8 (06-11-25 @ 06:29)  HR: 76 (06-11-25 @ 13:00)  BP: 103/65 (06-11-25 @ 13:00) (103/65 - 130/86)  RR: 18 (06-11-25 @ 13:00)  SpO2: 96% (06-11-25 @ 13:00)  Vent Settings:     In:   06-11-25 @ 07:01  -  06-11-25 @ 13:45  --------------------------------------------------------  IN: 0 mL      IV Fluids: dextrose 5% + sodium chloride 0.45%. 1000 milliLiter(s) (75 mL/Hr) IV Continuous <Continuous>  lactated ringers. 1000 milliLiter(s) (125 mL/Hr) IV Continuous <Continuous>      Out:   06-11-25 @ 07:01  -  06-11-25 @ 13:45  --------------------------------------------------------  OUT: 10 mL      EBL:     Voided Urine:   06-11-25 @ 07:01 - 06-11-25 @ 13:45  --------------------------------------------------------  OUT: 10 mL      Hassan Catheter: yes no   Drains:   ELZA:   06-11-25 @ 07:01  -  06-11-25 @ 13:45  --------------------------------------------------------  OUT: 10 mL     ,   Chest Tube:      NG Tube:       Physical Examination:  General Appearance: NAD  HEENT: EOMI, sclera non-icteric.  Heart: RRR  Lungs: equal chest rise b/l, non-labored breathing  Abdomen:  Soft, nontender, nondistended. No rigidity, guarding, or rebound tenderness.   MSK/Extremities: Warm & well-perfused. Peripheral pulses intact. Motor and sensory intact B/L. R ELZA drain in place draining dark sanguinous output. Dressing is C/D/I with no evidence of bleeding or drainage.   Skin: Warm, dry. No jaundice.       Medications: [Standing]  acetaminophen     Tablet .. 650 milliGRAM(s) Oral every 6 hours PRN  dextrose 5% + sodium chloride 0.45%. 1000 milliLiter(s) IV Continuous <Continuous>  HYDROmorphone  Injectable 0.5 milliGRAM(s) IV Push every 10 minutes PRN  HYDROmorphone  Injectable 1 milliGRAM(s) IV Push every 10 minutes PRN  lactated ringers. 1000 milliLiter(s) IV Continuous <Continuous>  meperidine     Injectable 12.5 milliGRAM(s) IV Push every 10 minutes PRN  morphine  - Injectable 2 milliGRAM(s) IV Push every 2 hours PRN  ondansetron Injectable 4 milliGRAM(s) IV Push once PRN  oxyCODONE    IR 5 milliGRAM(s) Oral every 4 hours PRN    Medications: [PRN]  acetaminophen     Tablet .. 650 milliGRAM(s) Oral every 6 hours PRN  dextrose 5% + sodium chloride 0.45%. 1000 milliLiter(s) IV Continuous <Continuous>  HYDROmorphone  Injectable 0.5 milliGRAM(s) IV Push every 10 minutes PRN  HYDROmorphone  Injectable 1 milliGRAM(s) IV Push every 10 minutes PRN  lactated ringers. 1000 milliLiter(s) IV Continuous <Continuous>  meperidine     Injectable 12.5 milliGRAM(s) IV Push every 10 minutes PRN  morphine  - Injectable 2 milliGRAM(s) IV Push every 2 hours PRN  ondansetron Injectable 4 milliGRAM(s) IV Push once PRN  oxyCODONE    IR 5 milliGRAM(s) Oral every 4 hours PRN    Labs:                Imaging:  No post-op imaging studies    Assessment:  25yMale patient S/P Resection of Right 1st Rib    Plan:  Regular Diet, maintain IVF hydration  Monitor R ELZA drain output quality and quantity   Strict I/Os - will continue to monitor UOP  Continue DVT ppx - SCDs, ambulation  Antiemetics/pain control PRN  Encouraged ambulation multiple times daily, patient instructed on IS use 10x/hr      Date/Time: 06-11-25 @ 13:45

## 2025-06-11 NOTE — BRIEF OPERATIVE NOTE - NSICDXBRIEFPREOP_GEN_ALL_CORE_FT
PRE-OP DIAGNOSIS:  Venous thoracic outlet syndrome of right subclavian vein 11-Jun-2025 09:52:11  Libby Green

## 2025-06-11 NOTE — BRIEF OPERATIVE NOTE - OPERATION/FINDINGS
Procedure:  Right first rib resection and venolysis via infraclavicular approach    Findings:  Normal appearing subclavian vein at the completion of the venolysis   No evidence of pleural air leak with valsalva

## 2025-06-12 ENCOUNTER — TRANSCRIPTION ENCOUNTER (OUTPATIENT)
Age: 25
End: 2025-06-12

## 2025-06-12 VITALS
SYSTOLIC BLOOD PRESSURE: 115 MMHG | DIASTOLIC BLOOD PRESSURE: 72 MMHG | HEART RATE: 100 BPM | TEMPERATURE: 100 F | RESPIRATION RATE: 18 BRPM | OXYGEN SATURATION: 95 %

## 2025-06-12 RX ORDER — SENNA 187 MG
2 TABLET ORAL
Qty: 10 | Refills: 0
Start: 2025-06-12

## 2025-06-12 RX ORDER — ACETAMINOPHEN 500 MG/5ML
2 LIQUID (ML) ORAL
Qty: 0 | Refills: 0 | DISCHARGE
Start: 2025-06-12

## 2025-06-12 RX ORDER — OXYCODONE HYDROCHLORIDE 30 MG/1
1 TABLET ORAL
Qty: 12 | Refills: 0
Start: 2025-06-12

## 2025-06-12 RX ORDER — NALOXONE HYDROCHLORIDE 0.4 MG/ML
1 INJECTION, SOLUTION INTRAMUSCULAR; INTRAVENOUS; SUBCUTANEOUS
Qty: 1 | Refills: 0
Start: 2025-06-12

## 2025-06-12 RX ADMIN — OXYCODONE HYDROCHLORIDE 5 MILLIGRAM(S): 30 TABLET ORAL at 09:12

## 2025-06-12 RX ADMIN — OXYCODONE HYDROCHLORIDE 5 MILLIGRAM(S): 30 TABLET ORAL at 09:42

## 2025-06-12 RX ADMIN — Medication 650 MILLIGRAM(S): at 06:15

## 2025-06-12 RX ADMIN — Medication 1 APPLICATION(S): at 14:57

## 2025-06-12 RX ADMIN — Medication 650 MILLIGRAM(S): at 14:30

## 2025-06-12 RX ADMIN — Medication 650 MILLIGRAM(S): at 15:00

## 2025-06-12 NOTE — OCCUPATIONAL THERAPY INITIAL EVALUATION ADULT - NSOTDISCHREC_GEN_A_CORE
Pt requires supervision with with transfers and min/mod A with some ADLS. OT recommends d/cc to home with assistance when medically stable. Educated on following up with outpatient OT once cleared by MD.

## 2025-06-12 NOTE — DISCHARGE NOTE PROVIDER - CARE PROVIDER_API CALL
Manuel Siu.  Vascular Surgery  [address] [place] [address], [address]  [place] [address]  Phone: [phone]  Fax: [phone]  Follow Up Time: 2 weeks

## 2025-06-12 NOTE — DISCHARGE NOTE NURSING/CASE MANAGEMENT/SOCIAL WORK - NSDCPEFALRISK_GEN_ALL_CORE
For information on Fall & Injury Prevention, visit: [email or web address] OR  [email or web address] OR  https://www.cdc.gov/steadi/patient.html

## 2025-06-12 NOTE — DISCHARGE NOTE NURSING/CASE MANAGEMENT/SOCIAL WORK - PATIENT PORTAL LINK FT
You can access the FollowMyHealth Patient Portal offered by [facility] by registering at the following website: [email or web address]. By joining [employer]’s FollowMyHealth portal, you will also be able to view your health information using other applications (apps) compatible with our system.

## 2025-06-12 NOTE — PHYSICAL THERAPY INITIAL EVALUATION ADULT - GENERAL OBSERVATIONS, REHAB EVAL
Pt seen from 2723 - [id number]. Pt encountered standing in room with mother and father present. +ELZA drain, RUE sling. Pt left in b/s recliner following PT session. Pt is independent/supervision with functional mobility at this time no further PT interventions needed at this time. Please reconsult PT in future if any changes in functional mobility. NICOL drake.

## 2025-06-12 NOTE — PHYSICAL THERAPY INITIAL EVALUATION ADULT - RANGE OF MOTION EXAMINATION, REHAB EVAL
RUE ROM limited due to sling./Left UE ROM was WNL (within normal limits)/Left LE ROM was WNL (within normal limits)/Right LE ROM was WNL (within normal limits)

## 2025-06-12 NOTE — PROGRESS NOTE ADULT - ASSESSMENT
ASSESSMENT:  25y M w/ PMHx of thoracic outlet syndrome, no POD#1 s/p first rib resection     PLAN:  monitor yan drain output   pain control  incentive spirometry  DVT/GI prophylaxis  SCDs, IS  encourage ambulation  discharge planning    spectra [phone]

## 2025-06-12 NOTE — PHYSICAL THERAPY INITIAL EVALUATION ADULT - ADDITIONAL COMMENTS
Pt lives in private home with his parents. No stairs to enter, 2 FOS inside. Pt was independent with all functional mobility prior to admission.

## 2025-06-12 NOTE — PHYSICAL THERAPY INITIAL EVALUATION ADULT - PERTINENT HX OF CURRENT PROBLEM, REHAB EVAL
25y M w/ PMHx of thoracic outlet syndrome, now s/p first rib resection. ELZA drain was left in place draining minimal serous sanguinous output. Case was uncomplicated and pt is recovering well. He is voiding, tolerating a diet and ambulating appropriately. He is hemodynamically stable and ready to be discharged home.

## 2025-06-12 NOTE — DISCHARGE NOTE PROVIDER - NSDCCPCAREPLAN_GEN_ALL_CORE_FT
PRINCIPAL DISCHARGE DIAGNOSIS  Diagnosis: History of resection of rib  Assessment and Plan of Treatment: SURGERY DISCHARGE INSTRUCTIONS  FOLLOW-UP - with Dr. Siu in 2 weeks. Call the office to make an appointment or if you have any questions/concerns.  DIET - regular.   ACTIVITY- No heavy lifting for 4-6 wks over 10-20 lbs. Walking is encouraged. No running or swimming. No driving while taking pain medication.  WOUNDCARE - Some drainage from your incisions or drain sites is normal. If you have drainage from an open incision or drain site- cover loosely with sterile gauze and tape and change daily. If you have clear outer plastic dressing with gauze- remove 3 days after surgery and leave little white bandage strips underneath they will fall off on their own. You may shower 24 hours after surgery but no submerging wound under water for 2 weeks (tub bathing). Pat area dry when wet, keep clean and dry. Do not apply powders or lotion to wound area.   PAIN MEDS - Take prescription pain meds as instructed but only if needed. Take over the counter extra strength tylenol 500mg and/or ibprofen 400mg with food every 6 hours for pain instead of prescription pain meds if you do not need stronger pain control. No more than 4g of tylenol in 24hrs or 1g in 4 hrs. No mixing alcohol with prescription pain meds. No driving or operating machinery while taking prescription pain meds. Drink plenty of water and increase your fiber intake while taking prescription pain meds as these can cause constipation. If you do not have a bowel movement in 3 days take an over the counter stool softener of your choice daily. If you still do not have a bowel movement the following 2 days call your primary care physician.  OTHER MEDS - You may resume taking your Eliquis as prescribed starting on 6/13/25.   If you have any questions about your other regular home medications please call your primary care physician or the physician who prescribed those medications to you.         SECONDARY DISCHARGE DIAGNOSES  Diagnosis: H/O thoracic outlet syndrome  Assessment and Plan of Treatment:

## 2025-06-12 NOTE — DISCHARGE NOTE PROVIDER - NSDCMRMEDTOKEN_GEN_ALL_CORE_FT
Eliquis 5 mg oral tablet: 1 tab(s) orally 2 times a day   acetaminophen 325 mg oral tablet: 2 tab(s) orally every 6 hours As needed Temp greater or equal to 38C (100.4F), Mild Pain (1 - 3)  Eliquis 5 mg oral tablet: 1 tab(s) orally 2 times a day  oxyCODONE 5 mg oral tablet: 1 tab(s) orally every 6 hours as needed for  severe pain MDD: 4   acetaminophen 325 mg oral tablet: 2 tab(s) orally every 6 hours As needed Temp greater or equal to 38C (100.4F), Mild Pain (1 - 3)  Eliquis 5 mg oral tablet: 1 tab(s) orally 2 times a day  Narcan 4 mg/0.1 mL nasal spray: 1 spray(s) intranasally once a day as needed for respiratory depression  oxyCODONE 5 mg oral tablet: 1 tab(s) orally every 6 hours as needed for  severe pain MDD: 4  Senna 8.6 mg oral tablet: 2 tab(s) orally once a day (at bedtime) as needed for  constipation MDD: 2

## 2025-06-12 NOTE — OCCUPATIONAL THERAPY INITIAL EVALUATION ADULT - RUE MMT, REHAB EVAL
"[place] [address]  Facility NPI: [id number]    Agatha Chesterradhamac  Fax: [phone]  Phone: [phone] (Bruno: 4265, Alexia: 4266)  Email: [email or web address]  Facility NPI: [id number]    Agatha Anderson  Fax: [phone]  Phone: [phone] (Bruno: 4265, Alexia: 4266)  Email: [email or web address]  Facility NPI: [id number]    Agatha Anderson  Fax: [phone]  Phone: [phone] (Bruno: 4265, Alexia: 4266)  Email: [email or web address]    PLEASE UPDATED NICU CLINICAL  REF#: [id number] BABY GIRL     Jorden Hobson (6 days Female)     Date of Birth Social Security Number Address Home Phone MRN    2020  [id number] [phone] [id number]    Islam Marital Status          Non-Mandaeism Single       Admission Date Admission Type Admitting Provider Attending Provider Department, Room/Bed    1/10/18  Kassi Gray MD Arumugam, Chitra, MD [facility] NURSERY L 2, NN5/A    Discharge Date Discharge Disposition Discharge Destination                      Attending Provider: Kassi Gray MD     Allergies:  No Known Allergies    Isolation:  None   Infection:  None   Code Status:  FULL    Ht:  47 cm (18.5\")   Wt:  2200 g (4 lb 13.6 oz)    Admission Cmt:  None   Principal Problem:  None                Active Insurance as of 2018     Primary Coverage     Payor Plan Insurance Group Employer/Plan Group    [employer] AETNA [id number]     Payor Plan Address Payor Plan Phone Number Effective From Effective To    [id number] [phone] 2018     [place] [id number]       Subscriber Name Subscriber Birth Date Member ID       ALEJANDRO HOBSON 1990 [id number]                 Emergency Contacts      (Rel.) Home Phone Work " Phone Mobile Phone    Jessica Ballesteros (Mother) [phone] -- --               Physician Progress Notes (last 72 hours) (Notes from 2018  1:56 PM through 2018  1:56 PM)      Kim Jolly MD at 2018 10:00 AM  Version 2 of 2          ICU Inborn Progress Notes      Age: 4 days Follow Up Provider:     Sex: female Admit Attending: Kassi Gray MD   ASHWIN:  Gestational Age: 34w3d BW: 2335 g (5 lb 2.4 oz)   Corrected Gest. Age:  35w 0d    Subjective   Overview:      34 3/7 week twin delivered via C/Section for fetal intolerance of labor of twin B. Breech presentation. S/P BMZ. 2 hr PTD.     Interval History:    Discussed with bedside nurse patient's course overnight. Nursing notes reviewed.    Temp stable in incubator. Feeds started on  tolerating feedings increases.    Objective   Medications:     Scheduled Meds:    erythromycin 1 application Both Eyes Once     Continuous Infusions:      PRN Meds:   sodium chloride  •  sucrose  •  zinc oxide    Devices, Monitoring, Treatments:     Lines, Devices, Monitoring and Treatments:    Peripheral IV Insertion/Assessment (Infant) - Single Lumen 18 0520 superficial temporal vein, left 24 gauge (Active)   Indication/Daily Review of Necessity fluid therapy continuous;medication therapy intermittent 2018  8:00 AM   Site Preparation/Maintenance dressing: dry and intact 2018  8:00 AM   Securement site guard in place 2018  8:00 AM   Patency/Maintenance flushed without difficulty 2018  8:00 AM   IV Device WDL WDL 2018 11:00 AM   Site Signs/Symptoms no redness;no warmth;no swelling;no pain;no streak formation;no drainage 2018  5:30 AM       Naso/Oral Tube 01/10/18 1720 5 left nostril (Active)   Type indwelling;nasoenteric 2018  8:00 AM   Indication gastric decompression 2018  5:20 PM   Placement Check Methods air injection auscultated 2018  8:00 AM   Tolerance no adverse signs/symptoms 2018  8:00  "AM   Securement taped to cheek 2018  8:00 AM   Insertion Site Appearance no redness;no warmth;no tenderness;no skin breakdown;no drainage 2018  8:00 AM       Necessity of devices was discussed with the treatment team and continued or discontinued as appropriate: yes    Respiratory Support:     Room air        Physical Exam:        Current: Weight: (!) 2180 g (4 lb 12.9 oz) (x2) Birth Weight Change: -7%   Last HC: 31.5 cm (12.4\")      PainScore:        Apnea and Bradycardia:   Apnea/Bradycardia Events (last 14 days)     None      Bradycardia rate: No Data Recorded    Temp:  [98 °F (36.7 °C)-98.8 °F (37.1 °C)] 98.5 °F (36.9 °C)  Heart Rate:  [112-160] 160  Resp:  [36-56] 36  BP: (56-69)/(30-38) 69/34  SpO2 Current: SpO2  Min: 96 %  Max: 100 %    Heent: fontanelles are soft and flat, NGT in place    Respiratory: clear breath sounds bilaterally, no retractions or nasal flaring. Good air entry heard.    Cardiovascular: RRR, S1 S2, no murmurs 2+ brachial and femoral pulses, brisk capillary refill   Abdomen: Soft, non tender,round, non-distended, good bowel sounds, no loops    : normal external genitalia   Extremities: well-perfused, warm and dry   Skin: no rashes, or bruising. Mild jaundice   Neuro: easily aroused, active, alert     Radiology and Labs:      I have reviewed all the lab results for the past 24 hours. Pertinent findings reviewed in assessment and plan.  {yes     I have reviewed all the imaging results for the past 24 hours. Pertinent findings reviewed in assessment and plan. yes    Intake and Output:      Current Weight: Weight: (!) 2180 g (4 lb 12.9 oz) (x2) Last 24hr Weight change: -30 g (-1.1 oz)   Growth:    7 day weight gain:  (to be calculated on M and Thu)   Caloric Intake:  Kcal/kg/day     Intake:     Total Fluid Goal: 140 ml/kg/day Total Fluid Actual: 124 ml/kg/day    Feeds: Formula  Similac Neosure 25 ml q3 hes Fortified: No   Route:NG/OG PO: 71%     IVF:none Blood Products: none "   Output:     UOP: x5 Emesis: x0   Stool: x1    Other: None         Assessment/Plan   Assessment and Plan:      Active Problems:  Prematurity, 2,000-2,499 grams, 33-34 completed weeks  Born by breech delivery  Twin delivery  Temperature regulation disturbance,   Assessment: Maternal PIH delivered at 34 3/7 week infant twin A delivered C section for decels in twin B. Infant born breech. Mom received 1 dose BMZ x1 2 hrs PTD. MBT O+. History of smoking, Prenatal labs:MBT O+, RPR-NR, HepB neg, Rub IM, HIV neg,  BBT O+ TIERA negative. Bili () 5.3  Plan:  1. Appropriate developmental care  2. Car seat test prior to discharge.  3. Incubator for thermoregulation- wean as tolerated  4. Follow bili 1/15.      Need for observation and evaluation of  for sepsis  Assessment: Maternal fever 101, Elevated CRP, GBS unknown. Blood culture (1/10) NGTD. Amp/Gent- 1/10-  CBC x 2 reassuring.  Plan:   1. Follow blood culture results till final.  2. CBC in am 1/15.        Disorder of hip joint  Assessment: Twin A infant born breech presentation. Left leg and foot abducted. X-rays of hips/legs (1/10)- no evidence of dislocation noted.  Plan:   1. OT consulted .   2. Will need hip ultrasound at 4-6 weeks of life.  3. OT recommendations (possible [facility] referral). Therapy Frequency: 2-3 times/wk       Slow feeding in   Maternal GDM-on insulin  Assessment: Infant admitted NPO. Mom plans on breastfeeding. Receiving Neosure only at this time.  Some small emesis noted.  Abdominal examine WNL, baby having BM. Tolerating feeds of 12 ml q 3 hours  Plan:  1. Increase feedings of MBM/Neosure to 30 ml q 3hours (102ml/kg/day).  2. Monitor glucoses per protocol.  3. Work on po feeds as luis antonio.  4. Neochem profile in am 1/15.    Health Care Maintenance  NBS-sent   PMD  ABR  CCHD-pass   HepB-given   Hip US as out patient         Discharge Planning:      Congenital Heart Disease Screen:    [place] Testing  [facility]  Initial CCHD Screening  SpO2: Pre-Ductal (Right Hand): 97 % (18)  SpO2: Post-Ductal (Left Hand/Foot): 98 (18)  Difference in oxygen saturation: 1 (18)  CCHD Screening results: Pass (18)   Car Seat Challenge Test     Hearing Screen      [place] Screen       Immunization History   Administered Date(s) Administered   • Hep B, Adolescent or Pediatric 2018         Expected Discharge Date: Unknown    Social comments: None at present  Family Communication: Updated parents daily      Jose L Roman, APRN  18  10:00 AM    Patient rounds conducted with Primary Care Nurse       I have reviewed the history, data, problems, assessment and plan with the nurse practitioner during rounds and agree with the documented findings and plan of care.  In room air.  Weaning isolette as tolerated.  Monitoring labs and bilirubin.  Will need outpatient hip ultrasound due to breech presentation.  On advancing feeds.     Kim Jolly MD     Electronically signed by Kim Jolly MD at 2018  1:51 PM           Yoel Elena MD at 2018  8:39 AM  Version 3 of 3          ICU Inborn Progress Notes      Age: 5 days Follow Up Provider:     Sex: female Admit Attending: Kassi Gray MD   ASHWIN:  Gestational Age: 34w3d BW: 2335 g (5 lb 2.4 oz)   Corrected Gest. Age:  35w 1d    Subjective   Overview:      34 3/7 week twin delivered via C/Section for fetal intolerance of labor of twin B. Breech presentation. S/P BMZ. 2 hr PTD.     Interval History:    Discussed with bedside nurse patient's course overnight. Nursing notes reviewed.    Temp stable in incubator. Feeds started on  tolerating feedings increases.    Objective   Medications:     Scheduled Meds:    erythromycin 1 application Both Eyes Once     Continuous Infusions:      PRN Meds:   sodium chloride  •  sucrose  •  zinc oxide    Devices, Monitoring, Treatments:     Lines, Devices, Monitoring and  "Treatments:    Peripheral IV Insertion/Assessment (Infant) - Single Lumen 01/11/18 0520 superficial temporal vein, left 24 gauge (Active)   Indication/Daily Review of Necessity fluid therapy continuous;medication therapy intermittent 2018  8:00 AM   Site Preparation/Maintenance dressing: dry and intact 2018  8:00 AM   Securement site guard in place 2018  8:00 AM   Patency/Maintenance flushed without difficulty 2018  8:00 AM   IV Device WDL WDL 2018 11:00 AM   Site Signs/Symptoms no redness;no warmth;no swelling;no pain;no streak formation;no drainage 2018  5:30 AM       Naso/Oral Tube 01/10/18 1720 5 left nostril (Active)   Type indwelling;nasoenteric 2018  8:00 AM   Indication gastric decompression 2018  5:20 PM   Placement Check Methods air injection auscultated 2018  8:00 AM   Tolerance no adverse signs/symptoms 2018  8:00 AM   Securement taped to cheek 2018  8:00 AM   Insertion Site Appearance no redness;no warmth;no tenderness;no skin breakdown;no drainage 2018  8:00 AM       Necessity of devices was discussed with the treatment team and continued or discontinued as appropriate: yes    Respiratory Support:     Room air        Physical Exam:        Current: Weight: (!) 2160 g (4 lb 12.2 oz) Birth Weight Change: -7%   Last HC: 31.5 cm (12.4\")      PainScore:        Apnea and Bradycardia:   Apnea/Bradycardia Events (last 14 days)     None      Bradycardia rate: No Data Recorded    Temp:  [98.3 °F (36.8 °C)-98.8 °F (37.1 °C)] 98.3 °F (36.8 °C)  Heart Rate:  [120-160] 130  Resp:  [34-58] 44  BP: (67-82)/(34-54) 82/54  SpO2 Current: SpO2  Min: 95 %  Max: 100 %    Heent: fontanelles are soft and flat, NGT in place    Respiratory: clear breath sounds bilaterally, no retractions or nasal flaring. Good air entry heard.    Cardiovascular: RRR, S1 S2, no murmurs 2+ brachial and femoral pulses, brisk capillary refill   Abdomen: Soft, non tender,round, " non-distended, good bowel sounds, no loops    : normal external genitalia   Extremities: well-perfused, warm and dry   Skin: no rashes, or bruising. Mild jaundice   Neuro: easily aroused, active, alert     Radiology and Labs:      I have reviewed all the lab results for the past 24 hours. Pertinent findings reviewed in assessment and plan.  {yes     I have reviewed all the imaging results for the past 24 hours. Pertinent findings reviewed in assessment and plan. yes    Intake and Output:      Current Weight: Weight: (!) 2160 g (4 lb 12.2 oz) Last 24hr Weight change: -20 g (-0.7 oz)   Growth:    7 day weight gain:  (to be calculated on M and Thu)   Caloric Intake:  Kcal/kg/day     Intake:     Total Fluid Goal: 140 ml/kg/day Total Fluid Actual: 93 ml/kg/day    Feeds: Formula  Similac Neosure 30 ml q3 hes Fortified: No   Route:NG/OG PO: 95%     IVF:none Blood Products: none   Output:     UOP: x7 Emesis: x0   Stool: x4    Other: None         Assessment/Plan   Assessment and Plan:      Active Problems:  Prematurity, 2,000-2,499 grams, 33-34 completed weeks  Born by breech delivery  Twin delivery  Temperature regulation disturbance,   Assessment: Maternal PIH delivered at 34 3/7 week infant twin A delivered C section for decels in twin B. Infant born breech. Mom received 1 dose BMZ x1 2 hrs PTD. MBT O+. History of smoking, Prenatal labs:MBT O+, RPR-NR, HepB neg, Rub IM, HIV neg,  BBT O+ TIERA negative. Bili (1/15) 5.1.  Plan:  1. Appropriate developmental care  2. Car seat test prior to discharge.  3. Incubator for thermoregulation- wean as tolerated  4. Follow bili prn      Need for observation and evaluation of  for sepsis  Assessment: Maternal fever 101, Elevated CRP, GBS unknown. Blood culture (1/10) NGTD. S/P Amp/Gent- 1/10-  CBC x 3 reassuring.  Plan:   1. Follow blood culture results till final.  2. CBC prn        Disorder of hip joint  Assessment: Twin A infant born breech presentation. Left leg  and foot abducted. X-rays of hips/legs (1/10)- no evidence of dislocation noted.  OT consulted   Plan:   1. Will need hip ultrasound at 4-6 weeks of life.  2. OT recommendations (possible [facility] referral). Therapy Frequency: 2-3 times/wk       Slow feeding in   Maternal GDM-on insulin  Assessment: Infant admitted NPO. Mom plans on breastfeeding. Receiving Neosure only at this time. H/O small emesis---none  Noted -1/15.  Abdominal examine WNL, baby having BM. Tolerating feeds.  Plan:  1. Increase feedings of MBM/Neosure to 35 ml q 3hours (120ml/kg/day).  2. Monitor glucoses per protocol.  3. Work on po feeds as luis antonio.  4. Neochem profile prn    Health Care Maintenance  NBS-sent   PMD  ABR  CCHD-pass   HepB-given   Hip US as out patient         Discharge Planning:      Congenital Heart Disease Screen:     Testing  CCHD Initial CCHD Screening  SpO2: Pre-Ductal (Right Hand): 97 % (18 1800)  SpO2: Post-Ductal (Left Hand/Foot): 98 (18 1800)  Difference in oxygen saturation: 1 (18 1800)  CCHD Screening results: Pass (18 1800)   Car Seat Challenge Test     Hearing Screen       Screen       Immunization History   Administered Date(s) Administered   • Hep B, Adolescent or Pediatric 2018         Expected Discharge Date: Unknown    Social comments: None at present  Family Communication: Updated parents daily      JOEL Santiago  01/15/18  8:39 AM    Patient rounds conducted with Primary Care Nurse       ATTESTATION:  I have reviewed the history, data, problems, assessment and plan with the nurse practitioner during rounds and agree with the documented findings and plan of care.  Baby on RA. Tolerating feeds. Will continue to increase. Working on PO.     Yoel Elena MD  01/15/18  11:06 AM             Electronically signed by Yoel Elena MD at 2018 11:06 AM           JOEL Elliott at 2018 10:04 AM  Version 1 of 1    Attestation  "signed by Kassi Gray MD at 2018 12:55 PM        I have reviewed the history, data, problems, assessment and plan with the nurse practitioner during rounds and agree with the documented findings and plan of care    Kassi Gray MD  18  12:55 PM                                      ICU Inborn Progress Notes      Age: 6 days Follow Up Provider:     Sex: female Admit Attending: Kassi Gray MD   ASHWIN:  Gestational Age: 34w3d BW: 2335 g (5 lb 2.4 oz)   Corrected Gest. Age:  35w 2d    Subjective   Overview:      34 3/7 week twin delivered via C/Section for fetal intolerance of labor of twin B. Breech presentation. S/P BMZ. 2 hr PTD.     Interval History:    Discussed with bedside nurse patient's course overnight. Nursing notes reviewed.    Temp stable in incubator. Feeds started on  tolerating feedings increases.    Objective   Medications:     Scheduled Meds:    erythromycin 1 application Both Eyes Once     Continuous Infusions:      PRN Meds:   sodium chloride  •  sucrose  •  zinc oxide    Devices, Monitoring, Treatments:     Lines, Devices, Monitoring and Treatments:      NG(1/10-present)    Necessity of devices was discussed with the treatment team and continued or discontinued as appropriate: yes    Respiratory Support:     Room air        Physical Exam:        Current: Weight: (!) 2200 g (4 lb 13.6 oz) Birth Weight Change: -6%   Last HC: 31.5 cm (12.4\")      PainScore:        Apnea and Bradycardia:   Apnea/Bradycardia Events (last 14 days)     None      Bradycardia rate: No Data Recorded    Temp:  [98.1 °F (36.7 °C)-99.7 °F (37.6 °C)] 98.3 °F (36.8 °C)  Heart Rate:  [136-160] 144  Resp:  [31-54] 52  BP: (61-70)/(37-41) 64/37  SpO2 Current: SpO2  Min: 97 %  Max: 100 %    Heent: fontanelles are soft and flat, NGT in place    Respiratory: clear breath sounds bilaterally, no retractions or nasal flaring. Good air entry heard.    Cardiovascular: RRR, S1 S2, no murmurs 2+ brachial and " femoral pulses, brisk capillary refill   Abdomen: Soft, non tender,round, non-distended, good bowel sounds, no loops    : normal external genitalia   Extremities: well-perfused, warm and dry   Skin: no rashes, or bruising. Mild jaundice   Neuro: easily aroused, active, alert     Radiology and Labs:      I have reviewed all the lab results for the past 24 hours. Pertinent findings reviewed in assessment and plan.  {yes     I have reviewed all the imaging results for the past 24 hours. Pertinent findings reviewed in assessment and plan. yes    Intake and Output:      Current Weight: Weight: (!) 2200 g (4 lb 13.6 oz) Last 24hr Weight change: 40 g (1.4 oz)   Growth:    7 day weight gain:  (to be calculated on  and u)   Caloric Intake:  Kcal/kg/day     Intake:     Total Fluid Goal: 140 ml/kg/day Total Fluid Actual: 117 ml/kg/day    Feeds: Formula  Similac Neosure 35 ml q3 hes Fortified: No   Route:NG/OG PO x 8 67%     IVF:none Blood Products: none   Output:     UOP: x10 Emesis: x1   Stool: x4    Other: None         Assessment/Plan   Assessment and Plan:      Active Problems:  Prematurity, 2,000-2,499 grams, 33-34 completed weeks  Born by breech delivery  Twin delivery  Temperature regulation disturbance,   Assessment: Maternal PIH delivered at 34 3/7 week infant twin A delivered C section for decels in twin B. Infant born breech. Mom received 1 dose BMZ x1 2 hrs PTD. MBT O+. History of smoking, Prenatal labs:MBT O+, RPR-NR, HepB neg, Rub IM, HIV neg,  BBT O+ TIERA negative. Bili (1/15) 5.1.  Plan:  1. Appropriate developmental care  2. Car seat test prior to discharge.  3. Incubator for thermoregulation- wean as tolerated  4. Follow bili prn      Disorder of hip joint  Assessment: Twin A infant born breech presentation. Left leg and foot abducted. X-rays of hips/legs (1/10)- no evidence of dislocation noted.  OT consulted   Plan:   1. Will need hip ultrasound at 4-6 weeks of life.  2. OT recommendations  (possible [facility] referral). Therapy Frequency: 2-3 times/wk       Slow feeding in   Maternal GDM-on insulin  Assessment: Infant admitted NPO. Mom plans on breastfeeding. Receiving Neosure only at this time. H/O small emesis---none  Noted -1/15.  Abdominal examine WNL, baby having BM. Tolerating feeds.  Plan:  1. Increase feedings of MBM/Neosure to 40 ml q 3hours (139ml/kg/day).  2. Monitor glucoses per protocol.  3. Work on po feeds as luis antonio.  4. Neochem profile prn    Health Care Maintenance  NBS-sent   PMD  ABR  CCHD-pass   HepB-given   Hip US as out patient      Resolved patient problems    Need for observation and evaluation of  for sepsis  Assessment: Maternal fever 101, Elevated CRP, GBS unknown. Blood culture (1/10) FNG. S/P Amp/Gent- 1/10-  CBC x 3 reassuring.         Discharge Planning:      Congenital Heart Disease Screen:     Testing  [facility]  SpO2: Pre-Ductal (Right Hand): 97 % (18 1800)  SpO2: Post-Ductal (Left Hand/Foot): 98 (18 1800)  Difference in oxygen saturation: 1 (18 1800)  [facility] results: Pass (18 1800)   Car Seat Challenge Test     Hearing Screen      [place] Screen       Immunization History   Administered Date(s) Administered   • Hep B, Adolescent or Pediatric 2018         Expected Discharge Date: Unknown    Social comments: None at present  Family Communication: Updated parents daily      JOEL Santiago  18  10:04 AM    Patient rounds conducted with Primary Care Nurse                  Electronically signed by Kassi Gray MD at 2018 12:55 PM         NON WB R UE/not tested due to

## 2025-06-12 NOTE — DISCHARGE NOTE PROVIDER - NSDCFUSCHEDAPPT_GEN_ALL_CORE_FT
Manuel Siu  [facility] Physician [facility]  VASCULAR [address]  Scheduled Appointment: 06/26/2025    Hemal Coppola  [facility] PreAdmits  Scheduled Appointment: 07/22/2025    Hemal Coppola  [facility]  [facility]  [address]  Scheduled Appointment: 07/22/2025

## 2025-06-12 NOTE — DISCHARGE NOTE PROVIDER - HOSPITAL COURSE
25y M w/ PMHx of thoracic outlet syndrome, now s/p first rib resection. ELZA drain was left in place draining minimal serous sanguinous output. Case was uncomplicated and pt is recovering well. He is voiding, tolerating a diet and ambulating appropriately. He is hemodynamically stable and ready to be discharged home. 25y M w/ PMHx of thoracic outlet syndrome, now s/p first rib resection. ELZA drain was left in place draining minimal serous sanguinous output. Case was uncomplicated and pt is recovering well. He is voiding, tolerating a diet and ambulating appropriately. He is hemodynamically stable and ready to be discharged home with outpatient Occupational therapy.

## 2025-06-12 NOTE — DISCHARGE NOTE NURSING/CASE MANAGEMENT/SOCIAL WORK - FINANCIAL ASSISTANCE
[facility] provides services at a reduced cost to those who are determined to be eligible through [facility]’s financial assistance program. Information regarding [facility]’s financial assistance program can be found by going to [email or web address] or by calling [phone].

## 2025-06-12 NOTE — PROGRESS NOTE ADULT - SUBJECTIVE AND OBJECTIVE BOX
GENERAL SURGERY PROGRESS NOTE    Patient: DAVIDE SEXTON , 25y ([birth year])Male   MRN: [id number]  Location: [address]  Visit: 06-11-25 Inpatient  Date: 06-12-25 @ 04:19      Procedure/Dx/Injuries: Right first rib resection      Events of past 24 hours: no acute post op events. pain is well controlled, patient tolerated diet, ambulated and voided post op     PAST MEDICAL & SURGICAL HISTORY:  DVT (deep venous thrombosis)      Pulmonary embolism      History of dental surgery      S/P vascular surgery          Vitals:   T(F): 98.5 (06-12-25 @ 00:31), Max: 98.5 (06-12-25 @ 00:31)  HR: 73 (06-12-25 @ 00:31)  BP: 111/65 (06-12-25 @ 00:31)  RR: 18 (06-12-25 @ 00:31)  SpO2: 98% (06-12-25 @ 00:31)      Diet, Regular      Fluids: dextrose 5% + sodium chloride 0.45%.: Solution, 1000 milliLiter(s) infuse at 75 mL/Hr  Provider's Contact #: [phone]      I & O's:      Bowel Movement: : [] YES [] NO  Flatus: : [] YES [] NO    PHYSICAL EXAM:  General: NAD, AAOx3, calm and cooperative  HEENT:  Neck supple  Chest: dressing in place c/d/i. no hematoma, yan ss  Cardiac: RRR S1, S2,   Respiratory: CTAB,  Abdomen: Soft, non-distended, non-tender,   Vascular: Pulses 2+ throughout, extremities well perfused      MEDICATIONS  (STANDING):  dextrose 5% + sodium chloride 0.45%. 1000 milliLiter(s) (75 mL/Hr) IV Continuous <Continuous>    MEDICATIONS  (PRN):  acetaminophen     Tablet .. 650 milliGRAM(s) Oral every 6 hours PRN Temp greater or equal to 38C (100.4F), Mild Pain (1 - 3)  morphine  - Injectable 2 milliGRAM(s) IV Push every 2 hours PRN Severe Pain (7 - 10)  oxyCODONE    IR 5 milliGRAM(s) Oral every 4 hours PRN Moderate Pain (4 - 6)      DVT PROPHYLAXIS:   GI PROPHYLAXIS:   ANTICOAGULATION:   ANTIBIOTICS:            LAB/STUDIES:  Labs:  CAPILLARY BLOOD GLUCOSE        IMAGING:  none

## 2025-06-13 LAB — SURGICAL PATHOLOGY STUDY: SIGNIFICANT CHANGE UP

## 2025-06-20 DIAGNOSIS — Z79.01 LONG TERM (CURRENT) USE OF ANTICOAGULANTS: ICD-10-CM

## 2025-06-20 DIAGNOSIS — G54.0 BRACHIAL PLEXUS DISORDERS: ICD-10-CM

## 2025-06-20 DIAGNOSIS — Z86.718 PERSONAL HISTORY OF OTHER VENOUS THROMBOSIS AND EMBOLISM: ICD-10-CM

## 2025-06-20 DIAGNOSIS — Z86.711 PERSONAL HISTORY OF PULMONARY EMBOLISM: ICD-10-CM

## 2025-06-20 DIAGNOSIS — Z91.018 ALLERGY TO OTHER FOODS: ICD-10-CM

## 2025-06-20 DIAGNOSIS — I87.1 COMPRESSION OF VEIN: ICD-10-CM

## 2025-06-26 ENCOUNTER — APPOINTMENT (OUTPATIENT)
Dept: VASCULAR SURGERY | Facility: CLINIC | Age: 25
End: 2025-06-26
Payer: COMMERCIAL

## 2025-06-26 ENCOUNTER — OUTPATIENT (OUTPATIENT)
Dept: OUTPATIENT SERVICES | Facility: HOSPITAL | Age: 25
LOS: 1 days | End: 2025-06-26
Payer: COMMERCIAL

## 2025-06-26 VITALS
SYSTOLIC BLOOD PRESSURE: 131 MMHG | DIASTOLIC BLOOD PRESSURE: 79 MMHG | HEIGHT: 65 IN | BODY MASS INDEX: 31.16 KG/M2 | WEIGHT: 187 LBS

## 2025-06-26 DIAGNOSIS — Z92.89 PERSONAL HISTORY OF OTHER MEDICAL TREATMENT: Chronic | ICD-10-CM

## 2025-06-26 DIAGNOSIS — R05.9 COUGH, UNSPECIFIED: ICD-10-CM

## 2025-06-26 DIAGNOSIS — G54.0 BRACHIAL PLEXUS DISORDERS: ICD-10-CM

## 2025-06-26 PROCEDURE — 71046 X-RAY EXAM CHEST 2 VIEWS: CPT

## 2025-06-26 PROCEDURE — 71046 X-RAY EXAM CHEST 2 VIEWS: CPT | Mod: 26

## 2025-06-26 PROCEDURE — 99024 POSTOP FOLLOW-UP VISIT: CPT

## 2025-06-27 DIAGNOSIS — G54.0 BRACHIAL PLEXUS DISORDERS: ICD-10-CM

## 2025-06-27 DIAGNOSIS — R05.9 COUGH, UNSPECIFIED: ICD-10-CM

## 2025-07-22 ENCOUNTER — APPOINTMENT (OUTPATIENT)
Age: 25
End: 2025-07-22
Payer: COMMERCIAL

## 2025-07-22 VITALS
SYSTOLIC BLOOD PRESSURE: 140 MMHG | DIASTOLIC BLOOD PRESSURE: 65 MMHG | HEIGHT: 65 IN | HEART RATE: 70 BPM | WEIGHT: 189 LBS | RESPIRATION RATE: 14 BRPM | BODY MASS INDEX: 31.49 KG/M2 | TEMPERATURE: 97.9 F | OXYGEN SATURATION: 100 %

## 2025-07-22 PROCEDURE — G2211 COMPLEX E/M VISIT ADD ON: CPT | Mod: NC

## 2025-07-22 PROCEDURE — 99213 OFFICE O/P EST LOW 20 MIN: CPT

## 2025-08-07 ENCOUNTER — APPOINTMENT (OUTPATIENT)
Dept: VASCULAR SURGERY | Facility: CLINIC | Age: 25
End: 2025-08-07
Payer: COMMERCIAL

## 2025-08-07 VITALS — HEIGHT: 65 IN | WEIGHT: 186 LBS | BODY MASS INDEX: 30.99 KG/M2

## 2025-08-07 DIAGNOSIS — Q76.5 BRACHIAL PLEXUS DISORDERS: ICD-10-CM

## 2025-08-07 DIAGNOSIS — M79.89 OTHER SPECIFIED SOFT TISSUE DISORDERS: ICD-10-CM

## 2025-08-07 DIAGNOSIS — I82.629 ACUTE EMBOLISM AND THROMBOSIS OF DEEP VEINS OF UNSPECIFIED UPPER EXTREMITY: ICD-10-CM

## 2025-08-07 DIAGNOSIS — G54.0 BRACHIAL PLEXUS DISORDERS: ICD-10-CM

## 2025-08-07 PROCEDURE — 93971 EXTREMITY STUDY: CPT

## 2025-08-07 PROCEDURE — 99213 OFFICE O/P EST LOW 20 MIN: CPT | Mod: 24
